# Patient Record
(demographics unavailable — no encounter records)

---

## 2017-01-09 NOTE — RAD
EXAM: Obstetric sonogram.



HISTORY: Fetal anatomy survey.



TECHNIQUE: Sonographic imaging of a gravid uterus was performed.



COMPARISON: 9/13/2016.



FINDINGS: There is a single intrauterine fetus in breech presentation with a

heart rate of 139 bpm. The amniotic fluid index is normal at 13.7 cm. There is

anterior placenta without evidence of previa. The cervix is obscured.



The fetal biparietal diameter corresponds with 19 weeks and 3 days. The head

circumference corresponds with 19 weeks and 4 days. The abdominal second which

corresponds to 20 weeks and 3 days. The femoral length corresponds with 20

weeks and 3 days. The estimated gestational age based on combined ultrasound

measurements is 20 weeks and 0 days and the estimated fetal weight is 344 g.



There is fetal body movement. There is a 4 chambered fetal heart. There is a

three-vessel umbilical cord with normal insertion. The fetal bladder, kidneys,

stomach, spine, facial profile and brain are unremarkable.



IMPRESSION:

1. Single uterine fetus in breech presentation with a heart rate of 139 bpm

and estimated age based on ultrasound measurement of 20 weeks and 0 days.

2. Obscured cervix.